# Patient Record
Sex: FEMALE | Race: WHITE | NOT HISPANIC OR LATINO | Employment: OTHER | URBAN - METROPOLITAN AREA
[De-identification: names, ages, dates, MRNs, and addresses within clinical notes are randomized per-mention and may not be internally consistent; named-entity substitution may affect disease eponyms.]

---

## 2017-03-28 ENCOUNTER — TRANSCRIBE ORDERS (OUTPATIENT)
Dept: MAMMOGRAPHY | Facility: HOSPITAL | Age: 62
End: 2017-03-28

## 2017-03-28 DIAGNOSIS — Z12.31 VISIT FOR SCREENING MAMMOGRAM: Primary | ICD-10-CM

## 2017-04-04 ENCOUNTER — HOSPITAL ENCOUNTER (OUTPATIENT)
Dept: MAMMOGRAPHY | Facility: HOSPITAL | Age: 62
Discharge: HOME | End: 2017-04-04
Attending: OBSTETRICS & GYNECOLOGY | Admitting: OBSTETRICS & GYNECOLOGY

## 2017-04-04 DIAGNOSIS — Z12.31 VISIT FOR SCREENING MAMMOGRAM: ICD-10-CM

## 2017-04-04 PROCEDURE — 77063 BREAST TOMOSYNTHESIS BI: CPT | Performed by: RADIOLOGY

## 2017-04-04 PROCEDURE — G0202 SCR MAMMO BI INCL CAD: HCPCS

## 2017-04-04 PROCEDURE — 77067 SCR MAMMO BI INCL CAD: CPT | Performed by: RADIOLOGY

## 2017-04-04 PROCEDURE — 77063 BREAST TOMOSYNTHESIS BI: CPT

## 2018-08-30 ENCOUNTER — TRANSCRIBE ORDERS (OUTPATIENT)
Dept: MAMMOGRAPHY | Facility: HOSPITAL | Age: 63
End: 2018-08-30

## 2018-08-30 DIAGNOSIS — Z12.31 VISIT FOR SCREENING MAMMOGRAM: Primary | ICD-10-CM

## 2018-09-18 ENCOUNTER — HOSPITAL ENCOUNTER (OUTPATIENT)
Dept: MAMMOGRAPHY | Facility: HOSPITAL | Age: 63
Discharge: HOME | End: 2018-09-18
Attending: OBSTETRICS & GYNECOLOGY | Admitting: OBSTETRICS & GYNECOLOGY

## 2018-09-18 DIAGNOSIS — Z12.31 VISIT FOR SCREENING MAMMOGRAM: ICD-10-CM

## 2018-09-18 PROCEDURE — 77067 SCR MAMMO BI INCL CAD: CPT

## 2018-09-18 PROCEDURE — 77063 BREAST TOMOSYNTHESIS BI: CPT

## 2018-09-18 PROCEDURE — 77067 SCR MAMMO BI INCL CAD: CPT | Performed by: RADIOLOGY

## 2018-09-18 PROCEDURE — 77063 BREAST TOMOSYNTHESIS BI: CPT | Performed by: RADIOLOGY

## 2020-02-17 ENCOUNTER — TRANSCRIBE ORDERS (OUTPATIENT)
Dept: ADMINISTRATIVE | Facility: HOSPITAL | Age: 65
End: 2020-02-17

## 2020-02-17 DIAGNOSIS — Z12.31 VISIT FOR SCREENING MAMMOGRAM: Primary | ICD-10-CM

## 2020-02-20 ENCOUNTER — HOSPITAL ENCOUNTER (OUTPATIENT)
Dept: MAMMOGRAPHY | Facility: HOSPITAL | Age: 65
Discharge: HOME | End: 2020-02-20
Admitting: OBSTETRICS & GYNECOLOGY

## 2020-02-20 DIAGNOSIS — Z12.31 VISIT FOR SCREENING MAMMOGRAM: ICD-10-CM

## 2020-02-20 PROCEDURE — 77063 BREAST TOMOSYNTHESIS BI: CPT | Performed by: RADIOLOGY

## 2020-02-20 PROCEDURE — 77063 BREAST TOMOSYNTHESIS BI: CPT

## 2020-02-20 PROCEDURE — 77067 SCR MAMMO BI INCL CAD: CPT

## 2020-02-20 PROCEDURE — 77067 SCR MAMMO BI INCL CAD: CPT | Performed by: RADIOLOGY

## 2021-05-11 ENCOUNTER — OFFICE VISIT (OUTPATIENT)
Dept: OBSTETRICS AND GYNECOLOGY | Facility: CLINIC | Age: 66
End: 2021-05-11

## 2021-05-11 VITALS
DIASTOLIC BLOOD PRESSURE: 80 MMHG | HEIGHT: 64 IN | WEIGHT: 181.2 LBS | SYSTOLIC BLOOD PRESSURE: 144 MMHG | BODY MASS INDEX: 30.93 KG/M2

## 2021-05-11 DIAGNOSIS — L90.0 LICHEN SCLEROSUS: ICD-10-CM

## 2021-05-11 DIAGNOSIS — N64.4 BREAST PAIN, LEFT: Primary | ICD-10-CM

## 2021-05-11 PROCEDURE — 99214 OFFICE O/P EST MOD 30 MIN: CPT | Performed by: NURSE PRACTITIONER

## 2021-05-11 RX ORDER — CRANBERRY FRUIT EXTRACT 200 MG
600 CAPSULE ORAL
COMMUNITY

## 2021-05-11 NOTE — PROGRESS NOTES
"Chief Complaint   Patient presents with   • Breast Pain   • Lichen sclerosis       Subjective   HPI  Linda Perez is a 66 y.o. female, , who presents with complaints of left breast pain and progressively worse lichen sclerosis.      She states she has experienced left breast pain for 2-3 months. She states she notices it more in certain positions; seems to be \"chest pain\" (soreness) with stretching a certain way. She describes the severity as mild.  She states that the problem has improved; it now occurs intermittently instead of every morning.     Lichen sclerosis was diagnosed in 2019; she states it has been getting worse over the past couple of months. Experiencing more tissue changes and itching; sparing clobetasol helps the itching, but tissue continues to change.  Pt states it is really bothersome.     Her last LMP was No LMP recorded. Patient is postmenopausal. Partner Status: Marital Status: .  New Partners since last visit: no.  Desires STD Screening: no.    Additional OB/GYN History   Current contraception: contraceptive methods: Post menopausal status    Last Pap: 2019 - negative  Last Completed Pap Smear     This patient has no relevant Health Maintenance data.        History of abnormal Pap smear: no  Last mammogram:   Last Completed Mammogram          Ordered - MAMMOGRAM (Every 2 Years) Ordered on 2020  Mammo Screening Digital Tomosynthesis Bilateral With CAD    2018  Mammo screening digital tomosynthesis bilateral w CAD    2017  Mammo Screening Digital Tomosynthesis Bilateral With CAD    2015  MAMMOGRAPHY SCREENING BILATERAL    2014  MAMMOGRAPHY DIAGNOSTIC BILATERAL              Tobacco Usage?: No   OB History        2    Para   2    Term   2            AB        Living   1       SAB        TAB        Ectopic        Molar        Multiple        Live Births   2                Health Maintenance   Topic Date Due   • DXA " "SCAN  Never done   • COVID-19 Vaccine (1) Never done   • TDAP/TD VACCINES (1 - Tdap) Never done   • ZOSTER VACCINE (1 of 2) Never done   • Pneumococcal Vaccine 65+ (1 of 1 - PPSV23) Never done   • HEPATITIS C SCREENING  Never done   • ANNUAL WELLNESS VISIT  Never done   • PAP SMEAR  05/11/2021   • INFLUENZA VACCINE  08/01/2021   • MAMMOGRAM  02/20/2022   • COLORECTAL CANCER SCREENING  10/01/2027       The additional following portions of the patient's history were reviewed and updated as appropriate: allergies, current medications, past family history, past medical history, past social history, past surgical history and problem list.    Review of Systems   Constitutional: Negative.    HENT: Negative.    Eyes: Negative.    Respiratory: Negative.    Cardiovascular: Negative.    Endocrine: Negative.    Genitourinary: Negative.  Positive for breast pain.        Vulvar irritation, lichens   Musculoskeletal: Negative.    Skin: Positive for skin lesions.   Allergic/Immunologic: Negative.    Neurological: Negative.    Hematological: Negative.    Psychiatric/Behavioral: Negative.        I have reviewed and agree with the HPI, ROS, and historical information as entered above. GHASSAN Saeed    Objective   /80 (BP Location: Right arm, Patient Position: Sitting, Cuff Size: Adult)   Ht 162.6 cm (64\")   Wt 82.2 kg (181 lb 3.2 oz)   Breastfeeding No   BMI 31.10 kg/m²     Physical Exam  Vitals and nursing note reviewed. Exam conducted with a chaperone present.   Constitutional:       Appearance: Normal appearance.   HENT:      Head: Normocephalic and atraumatic.   Pulmonary:      Effort: Pulmonary effort is normal. No retractions.   Chest:      Chest wall: No mass.      Breasts:         Right: No mass, nipple discharge, skin change or tenderness.         Left: Tenderness (localized to the nipple extending to the 9 o'clock region) present. No mass, nipple discharge or skin change.   Genitourinary:     Labia:         " Right: No tenderness.         Left: No tenderness.       Comments: Significant vulvar atrophy secondary to lichen sclerosis   Neurological:      Mental Status: She is alert and oriented to person, place, and time.   Psychiatric:         Behavior: Behavior normal.         Assessment/Plan     Assessment     Problem List Items Addressed This Visit     None      Visit Diagnoses     Breast pain, left    -  Primary    Relevant Orders    Mammo Diagnostic Digital Tomosynthesis Bilateral With CAD    Lichen sclerosus                Plan     1. Left breast pain, proceed with Tip diagnostic mammogram.  2. Significant atrophy due to lichens, try clobetasol daily until RTC for eval with Riya in the next 2 weeks. Riya not currently in the office.       Ashley Kruger, APRN  05/11/2021

## 2021-05-12 RX ORDER — CLOBETASOL PROPIONATE 0.5 MG/G
OINTMENT TOPICAL DAILY
Qty: 30 G | Refills: 0 | Status: SHIPPED | OUTPATIENT
Start: 2021-05-12

## 2021-06-07 ENCOUNTER — HOSPITAL ENCOUNTER (OUTPATIENT)
Dept: MAMMOGRAPHY | Facility: HOSPITAL | Age: 66
Discharge: HOME | End: 2021-06-07
Admitting: NURSE PRACTITIONER

## 2021-06-07 DIAGNOSIS — N64.4 BREAST PAIN, LEFT: ICD-10-CM

## 2021-06-07 PROCEDURE — G0279 TOMOSYNTHESIS, MAMMO: HCPCS | Performed by: RADIOLOGY

## 2021-06-07 PROCEDURE — 77066 DX MAMMO INCL CAD BI: CPT

## 2021-06-07 PROCEDURE — 77066 DX MAMMO INCL CAD BI: CPT | Performed by: RADIOLOGY

## 2021-06-07 PROCEDURE — G0279 TOMOSYNTHESIS, MAMMO: HCPCS

## 2021-06-10 ENCOUNTER — OFFICE VISIT (OUTPATIENT)
Dept: OBSTETRICS AND GYNECOLOGY | Facility: CLINIC | Age: 66
End: 2021-06-10

## 2021-06-10 VITALS
SYSTOLIC BLOOD PRESSURE: 120 MMHG | BODY MASS INDEX: 31.07 KG/M2 | DIASTOLIC BLOOD PRESSURE: 80 MMHG | WEIGHT: 182 LBS | HEIGHT: 64 IN

## 2021-06-10 DIAGNOSIS — N90.4 LICHEN SCLEROSUS OF FEMALE GENITALIA: Primary | ICD-10-CM

## 2021-06-10 PROCEDURE — 99213 OFFICE O/P EST LOW 20 MIN: CPT | Performed by: OBSTETRICS & GYNECOLOGY

## 2021-06-10 RX ORDER — CYANOCOBALAMIN (VITAMIN B-12) 500 MCG
500 TABLET ORAL DAILY
COMMUNITY
End: 2022-11-10

## 2021-06-10 RX ORDER — CHLORAL HYDRATE 500 MG
CAPSULE ORAL
COMMUNITY
End: 2022-11-10

## 2021-06-10 RX ORDER — VIT C/B6/B5/MAGNESIUM/HERB 173 50-5-6-5MG
CAPSULE ORAL
COMMUNITY
End: 2022-11-10

## 2021-06-10 RX ORDER — MAGNESIUM CARB/ALUMINUM HYDROX 105-160MG
TABLET,CHEWABLE ORAL ONCE
COMMUNITY
End: 2022-11-10

## 2021-06-10 NOTE — PROGRESS NOTES
"Chief Complaint   Patient presents with   • Follow-up     Lichen sclerosus         Subjective   HPI  Linda Perez is a 66 y.o. female, , who presents with evaluation of Lichen Sclerosus that is persistent.      She was seen by Ashley on  for vulvar itching and breast issue.  She had a mammogram last week that was normal.  She was instructed to use clobetasol daily.  She used ointment for 2 consecutive days and developed severe irritation.  Since her visit she has used probably 5 x's, only when she was having severe itching.             Additional OB/GYN History   Last Pap :   Last Completed Pap Smear     This patient has no relevant Health Maintenance data.          OB History        2    Para   2    Term   2            AB        Living   1       SAB        TAB        Ectopic        Molar        Multiple        Live Births   2                The additional following portions of the patient's history were reviewed and updated as appropriate: allergies, current medications, past family history, past medical history, past social history, past surgical history and problem list.    Review of Systems   Constitutional: Negative.    HENT: Negative.    Eyes: Negative.    Respiratory: Negative.    Cardiovascular: Negative.    Gastrointestinal: Negative.    Endocrine: Negative.    Genitourinary: Positive for genital sores.   Musculoskeletal: Negative.    Skin: Negative.    Allergic/Immunologic: Negative.    Neurological: Negative.    Hematological: Negative.    Psychiatric/Behavioral: Negative.        I have reviewed and agree with the HPI, ROS, and historical information as entered above. Neelima Ritter MD    Objective   /80   Ht 162.6 cm (64\")   Wt 82.6 kg (182 lb)   Breastfeeding No   BMI 31.24 kg/m²     Physical Exam  Vitals and nursing note reviewed. Exam conducted with a chaperone present.   Constitutional:       Appearance: Normal appearance. She is well-developed.   HENT:      Head: " Normocephalic and atraumatic.      Nose: Nose normal. No congestion or rhinorrhea.   Eyes:      General: No scleral icterus.     Pupils: Pupils are equal, round, and reactive to light.   Pulmonary:      Effort: Pulmonary effort is normal.      Breath sounds: Normal breath sounds.   Abdominal:      General: There is no distension.      Palpations: Abdomen is soft.      Tenderness: There is no abdominal tenderness. There is no guarding or rebound.   Genitourinary:     General: Normal vulva.      Exam position: Lithotomy position.      Labia:         Right: No rash or lesion.         Left: No rash or lesion.       Urethra: No prolapse.      Vagina: Normal.      Cervix: No cervical motion tenderness, discharge, lesion or cervical bleeding.      Uterus: Normal. Not tender.       Adnexa: Right adnexa normal and left adnexa normal.        Right: No tenderness.          Left: No tenderness.        Rectum: Normal.   Musculoskeletal:         General: No swelling. Normal range of motion.      Cervical back: Normal range of motion and neck supple.   Skin:     General: Skin is warm and dry.   Neurological:      General: No focal deficit present.      Mental Status: She is alert and oriented to person, place, and time.   Psychiatric:         Mood and Affect: Mood normal.         Behavior: Behavior normal. Behavior is cooperative.     lichen sclerosis changes on bilateral labia, around clitorus, on perineum    Assessment/Plan     Assessment     Problem List Items Addressed This Visit     None      Visit Diagnoses     Lichen sclerosus of female genitalia    -  Primary          1. She has been hesitant to use the steroids, so will start using again daily and she will fu in 6 weeks for recheck. Discussed option of derm rerferal for other treatment options.       Neelima Ritter MD  06/10/2021

## 2022-06-01 ENCOUNTER — TELEPHONE (OUTPATIENT)
Dept: OBSTETRICS AND GYNECOLOGY | Facility: CLINIC | Age: 67
End: 2022-06-01

## 2022-06-01 NOTE — TELEPHONE ENCOUNTER
Pt called and was asking if she could get a refill on her clobetasol (TEMOVATE) 0.05 % ointment

## 2022-06-02 ENCOUNTER — TELEPHONE (OUTPATIENT)
Dept: OBSTETRICS AND GYNECOLOGY | Facility: CLINIC | Age: 67
End: 2022-06-02

## 2022-06-02 RX ORDER — CLOBETASOL PROPIONATE 0.5 MG/G
OINTMENT TOPICAL DAILY
Qty: 30 G | Refills: 0 | Status: CANCELLED | OUTPATIENT
Start: 2022-06-02

## 2022-06-02 NOTE — TELEPHONE ENCOUNTER
That cell number is not correct. No answer on the home phone. Last apt 6/2021. Apt cancelled for 7/21 and 8/21.

## 2022-06-03 NOTE — TELEPHONE ENCOUNTER
Attempted to call pt on home phone with no answer. If patient needs refill on steroid cream she will need an appointment.

## 2022-09-30 ENCOUNTER — TRANSCRIBE ORDERS (OUTPATIENT)
Dept: ADMINISTRATIVE | Facility: HOSPITAL | Age: 67
End: 2022-09-30

## 2022-09-30 DIAGNOSIS — Z12.31 VISIT FOR SCREENING MAMMOGRAM: Primary | ICD-10-CM

## 2022-11-04 ENCOUNTER — HOSPITAL ENCOUNTER (OUTPATIENT)
Dept: MAMMOGRAPHY | Facility: HOSPITAL | Age: 67
Discharge: HOME | End: 2022-11-04
Admitting: OBSTETRICS & GYNECOLOGY

## 2022-11-04 DIAGNOSIS — Z12.31 VISIT FOR SCREENING MAMMOGRAM: ICD-10-CM

## 2022-11-04 PROCEDURE — 77067 SCR MAMMO BI INCL CAD: CPT

## 2022-11-04 PROCEDURE — 77063 BREAST TOMOSYNTHESIS BI: CPT

## 2022-11-04 PROCEDURE — 77067 SCR MAMMO BI INCL CAD: CPT | Performed by: RADIOLOGY

## 2022-11-04 PROCEDURE — 77063 BREAST TOMOSYNTHESIS BI: CPT | Performed by: RADIOLOGY

## 2022-11-10 ENCOUNTER — OFFICE VISIT (OUTPATIENT)
Dept: OBSTETRICS AND GYNECOLOGY | Facility: CLINIC | Age: 67
End: 2022-11-10

## 2022-11-10 VITALS
DIASTOLIC BLOOD PRESSURE: 94 MMHG | SYSTOLIC BLOOD PRESSURE: 130 MMHG | BODY MASS INDEX: 29.3 KG/M2 | HEIGHT: 64 IN | WEIGHT: 171.6 LBS

## 2022-11-10 DIAGNOSIS — N90.4 LICHEN SCLEROSUS OF FEMALE GENITALIA: Primary | ICD-10-CM

## 2022-11-10 PROCEDURE — 99213 OFFICE O/P EST LOW 20 MIN: CPT | Performed by: OBSTETRICS & GYNECOLOGY

## 2022-11-10 NOTE — PROGRESS NOTES
Chief Complaint   Patient presents with   • Follow-up     Lichen sclerosis        Subjective   HPI  Linda Perez is a 67 y.o. female, . Her last LMP was No LMP recorded. Patient is postmenopausal.. who presents for follow up on lichens sclerous .  She was last seen 2021 for this.     At her last visit she was treated with clobetasol cream . Since then she reports her symptoms/issue has worsened since her last visit. She has been experiencing itching and irritation..Patient states that symptoms first started many years. The patient reports additional symptoms as none.  She is requesting a breast exam today. She denies any breast issues or concerns.     Armaan  passed away a couple months ago.         Additional OB/GYN History     Last Pap :   Last Completed Pap Smear     This patient has no relevant Health Maintenance data.          Last mammogram:   Last Completed Mammogram          MAMMOGRAM (Every 2 Years) Next due on 2022  Mammo Screening Digital Tomosynthesis Bilateral With CAD    2021  Mammo Diagnostic Digital Tomosynthesis Bilateral With CAD    2020  Mammo Screening Digital Tomosynthesis Bilateral With CAD    2018  Mammo screening digital tomosynthesis bilateral w CAD    2017  Mammo Screening Digital Tomosynthesis Bilateral With CAD    Only the first 5 history entries have been loaded, but more history exists.                Tobacco Usage?: No   OB History        2    Para   2    Term   2            AB        Living   1       SAB        IAB        Ectopic        Molar        Multiple        Live Births   2                  Current Outpatient Medications:   •  Cholecalciferol (VITAMIN D3 PO), Take  by mouth., Disp: , Rfl:   •  clobetasol (TEMOVATE) 0.05 % ointment, Apply  topically to the appropriate area as directed Daily., Disp: 30 g, Rfl: 0  •  Red Yeast Rice Extract 600 MG capsule, Take 600 mg by mouth., Disp: ,  "Rfl:      Past Medical History:   Diagnosis Date   • Breast injury 10/2018    bruising that spread to breast post-horse bite to right pectoris muscle    • History of bone density study 01/25/2019    left femur osteopenia; all other totals normal   • History of dyspareunia in female    • Lichen sclerosus 01/25/2019        Past Surgical History:   Procedure Laterality Date   • ENDOSCOPY  11/2013   • HERNIA REPAIR  11/2012    with suspected blockage, later thought to be twisted intestines   • LAPAROSCOPIC CHOLECYSTECTOMY  2003       The additional following portions of the patient's history were reviewed and updated as appropriate: allergies, current medications, past family history, past medical history, past social history, past surgical history and problem list.    Review of Systems   Constitutional: Negative.    HENT: Negative.    Eyes: Negative.    Respiratory: Negative.    Cardiovascular: Negative.    Gastrointestinal: Negative.    Endocrine: Negative.    Genitourinary: Negative.         Vaginal itching/irritation   Musculoskeletal: Negative.    Skin: Negative.    Allergic/Immunologic: Negative.    Neurological: Negative.    Hematological: Negative.    Psychiatric/Behavioral: Negative.        I have reviewed and agree with the HPI, ROS, and historical information as entered above. Neelima Ritter MD    Objective   /94   Ht 162.6 cm (64\")   Wt 77.8 kg (171 lb 9.6 oz)   BMI 29.46 kg/m²     Physical Exam  Vitals and nursing note reviewed.   Constitutional:       Appearance: Normal appearance.   HENT:      Head: Normocephalic and atraumatic.   Eyes:      General: No scleral icterus.     Pupils: Pupils are equal, round, and reactive to light.   Pulmonary:      Effort: Pulmonary effort is normal.      Breath sounds: Normal breath sounds.   Abdominal:      General: Bowel sounds are normal. There is no distension.      Palpations: Abdomen is soft.      Tenderness: There is no abdominal tenderness. "   Musculoskeletal:         General: Normal range of motion.      Cervical back: Normal range of motion and neck supple.      Right lower leg: No edema.      Left lower leg: No edema.   Skin:     General: Skin is warm and dry.   Neurological:      General: No focal deficit present.      Mental Status: She is alert and oriented to person, place, and time.   Psychiatric:         Mood and Affect: Mood normal.         Behavior: Behavior normal. Behavior is cooperative.     lichen sclerosis, some fusion of upper labia around clitoris, no raised or ulcerated spots.     Assessment & Plan     Assessment     Problem List Items Addressed This Visit        Gynecologic and Obstetric Problems    Lichen sclerosus of female genitalia - Primary       1. When she uses the clobetesol regularly, her symptoms are improved. Discussed option of derm referral for any options for further management. She has seen dr frank in the past at [facility]    Plan     No follow-ups on file.    I spent 20 minutes caring for Linda on this date of service. This time includes time spent by me in the following activities:preparing for the visit, reviewing tests, obtaining and/or reviewing a separately obtained history, performing a medically appropriate examination and/or evaluation  and documenting information in the medical record  1.       Neelima Ritter MD  11/10/2022

## 2025-04-23 ENCOUNTER — OFFICE VISIT (OUTPATIENT)
Dept: OBSTETRICS AND GYNECOLOGY | Facility: CLINIC | Age: 70
End: 2025-04-23
Payer: MEDICARE

## 2025-04-23 VITALS
SYSTOLIC BLOOD PRESSURE: 142 MMHG | HEIGHT: 64 IN | DIASTOLIC BLOOD PRESSURE: 78 MMHG | WEIGHT: 191.4 LBS | BODY MASS INDEX: 32.68 KG/M2

## 2025-04-23 DIAGNOSIS — S20.02XA CONTUSION OF LEFT BREAST, INITIAL ENCOUNTER: ICD-10-CM

## 2025-04-23 DIAGNOSIS — N64.4 BREAST PAIN: Primary | ICD-10-CM

## 2025-04-23 NOTE — PROGRESS NOTES
OBGYN Office Note      Chief Complaint   Patient presents with    Breast Problem        Subjective     HPI  Linda Perez is a 70 y.o. female, , who presents with breast complaints of tenderness.  This is present in  upper,middle area  of left breast(s) and intermittent pain in axillary tail.    She states she has experienced this problem for  2-3  weeks. The problem has remained unchanged since it began. The patient denies breast lump, nipple discharge, and nipple changes. She reports a bruise on left breast above area of pain. She denies known injury, but reports doing a lot of physical activity in the yard. She has had a mammogram before- Last @  2023. She does have a family history of breast cancer in her mother, maternal aunt, and two maternal cousins. Mother diagnosed at age 74 with reoccurrence at 81. Maternal aunt diagnosed at age 76. Maternal cousin diagnosed at age 21, unsure of other maternal cousin age of onset but  in her early 50s. She has had BRCA testing done.  Other concerns include: none. She is requesting to have mammogram done at [facility] in [place], [place].    She admits to maybe 2 cups of coffee weekly. She denies other caffeine related intake.    Patient reports that she is not currently experiencing any symptoms of urinary incontinence.      No LMP recorded. Patient is postmenopausal..      Current contraception: contraceptive methods: Post menopausal status    Last mammogram: 23 done at . Copy in the chart.   Last Completed Mammogram            Awaiting Completion       MAMMOGRAM (Every 2 Years) Order placed this encounter      2025  Order placed for Mammo Diagnostic Digital Tomosynthesis Bilateral With CAD by Sophia Riojas APRN    2023  Mammo Screening Digital Tomosynthesis Bilateral With CAD    2022  Mammo Screening Digital Tomosynthesis Bilateral With CAD    2021  Mammo Diagnostic Digital Tomosynthesis Bilateral With CAD     02/20/2020  Mammo Screening Digital Tomosynthesis Bilateral With CAD     Only the first 5 history entries have been loaded, but more history exists.                        Tobacco Usage?: No     Past Medical History:   Diagnosis Date    Breast injury 10/2018    bruising that spread to breast post-horse bite to right pectoris muscle     History of bone density study 01/25/2019    left femur osteopenia; all other totals normal    History of dyspareunia in female     Lichen sclerosus 01/25/2019       Past Surgical History:   Procedure Laterality Date    ENDOSCOPY  11/2013    HERNIA REPAIR  11/2012    with suspected blockage, later thought to be twisted intestines    LAPAROSCOPIC CHOLECYSTECTOMY  2003       Family History   Problem Relation Age of Onset    Breast cancer Mother 74        RECURRENCE AT 81    Heart disease Mother     Osteoporosis Mother     Thyroid disease Mother         thyroid removed    Breast cancer Maternal Aunt 76        lumpectomy and radiation    Hypertension Father     Thyroid cancer Father     Pancreatic cancer Father     Kidney cancer Father     Skin cancer Father     Thyroid disease Sister         thyroid removed    Heart disease Maternal Grandfather     Breast cancer Maternal Cousin         x2    Esophageal cancer Brother     Ovarian cancer Neg Hx           Current Outpatient Medications:     clobetasol (TEMOVATE) 0.05 % ointment, Apply  topically to the appropriate area as directed Daily., Disp: 30 g, Rfl: 0    Cholecalciferol (VITAMIN D3 PO), Take  by mouth. (Patient not taking: Reported on 4/23/2025), Disp: , Rfl:     Red Yeast Rice Extract 600 MG capsule, Take 600 mg by mouth. (Patient not taking: Reported on 4/23/2025), Disp: , Rfl:      Review of Systems   Respiratory: Negative.  Negative for shortness of breath.    Cardiovascular: Negative.  Negative for chest pain.   Genitourinary:  Positive for breast lump (Report hx of lumpy breasts) and breast pain (left). Negative for breast  "discharge and urinary incontinence.       I have reviewed and agree with the HPI, ROS, and historical information as entered above. Sophia Riojas, APRN      Objective   /78   Ht 162.6 cm (64\")   Wt 86.8 kg (191 lb 6.4 oz)   BMI 32.85 kg/m²     Physical Exam  Vitals and nursing note reviewed. Exam conducted with a chaperone present.   Constitutional:       General: She is not in acute distress.     Appearance: Normal appearance. She is not ill-appearing or toxic-appearing.   HENT:      Head: Normocephalic and atraumatic.   Pulmonary:      Effort: Pulmonary effort is normal.   Chest:      Chest wall: No deformity, swelling or tenderness.   Breasts:     Breasts are symmetrical.      Right: Normal. No swelling, bleeding, inverted nipple, mass, nipple discharge, skin change or tenderness.      Left: Normal. Skin change and tenderness present. No swelling, bleeding, inverted nipple, mass or nipple discharge.       Lymphadenopathy:      Upper Body:      Right upper body: No supraclavicular, axillary or pectoral adenopathy.      Left upper body: No supraclavicular, axillary or pectoral adenopathy.   Neurological:      Mental Status: She is alert and oriented to person, place, and time.   Psychiatric:         Mood and Affect: Mood normal.         Behavior: Behavior normal.           Assessment & Plan     Assessment     Problem List Items Addressed This Visit    None  Visit Diagnoses         Breast pain    -  Primary    Relevant Orders    Mammo Diagnostic Digital Tomosynthesis Bilateral With CAD      Contusion of left breast, initial encounter        Relevant Orders    Mammo Diagnostic Digital Tomosynthesis Bilateral With CAD            Left breast pain and bruise.   PE unremarkable.     Plan   Reassured the patient that the finding is most likely benign.  IBU, Tylenol PRN pain.   Will proceed with diagnostic imagining.   Breast tenderness education included in patient instructions.   Return for Annual physical or " sooner if needed.      Sophia Riojas, APRN  04/23/2025